# Patient Record
Sex: FEMALE | Race: WHITE
[De-identification: names, ages, dates, MRNs, and addresses within clinical notes are randomized per-mention and may not be internally consistent; named-entity substitution may affect disease eponyms.]

---

## 2017-03-08 NOTE — CODING QUERY MEDICAL NECESSITY
SUPPORTING DIAGNOSIS NEEDED





A supporting diagnosis is required for the test/procedure performed on this patient in 
order for us to be reimbursed by the patient's insurance. Please provide a supporting 
diagnosis for the following test/procedure listed below next to the test name along with 
your signature. 



*If there is no additional diagnosis for this patient that would support the following 
test/procedure please document that below next to the test/procedure.



Test(s)/Procedure(s) that require a supporting diagnosis:

  *  GLYCATED HEMOGLOBIN      DIAGNOSIS:

  *  VITAMIN B-12 LEVEL             DIAGNOSIS

  *  DOS: 3/2/17





Provider Signature:  ______________________________  Date:  _______



Thank you  

Sonja Odom

Health Information Management

Phone:  [phone]

Fax:  [phone]



Once completed, please kindly fax back to [phone]



For questions please call [phone]

## 2017-06-03 NOTE — PAP/PSG TECHNICIAN REPORT
[facility]

Technician Polysomnogram Report



Study name:

None

Report date:

6/3/2017



Study date:

6/2/2017

Referring Physician:

 DR. VELEZ



Name:

TEO MARLOW 

Interpreting Physician:

Ubaldo Velez M.D.



YOB: 1965

Technician:

Arsh Prescott RPSMELINDA.



Sex:

Female







Age:

51

StudyType:

PSG PAP



Weight:

252 lbs





16.5 inches



Height:

51 years, Height 5' 5.5"

Neck Circum:







BMI:

41.29







Medications:

ADVAIR DISKUS 250 MCG, BUPROPION HCL ER  MG, BUSPIRONE HCL 30 MG, CLONAZEPAM 0.5 MG, 
FLUOXETINE HCL 20 MG, LISINOPRIL 20 MG, LYRICA 300 MG, TRAZODONE HCL 50 MG, 















Patient History





PATIENT HAS BEEN WEARING CPAP SINCE 2000. HE MOST RECENT SLEEP STUDY WAS DONE IN 2012. SHE CURRENTLY 
DOES NOT KNOW WHAT HER PRESSURE IS SET AT. SHE IS HERE TODAY FOR AN UPDATE ON HER CPAP SETTINGS. ESS 
= 13 RM 5







Parameters Monitored

NPSG: E1-M2, E2-M1, Fp1-M2, Fp2-M1, F3-M2, F4-M2, F4-M1, C3-M2, C4-M2, C4-M1, O1-M2, O2-M2,

O2-M1, T3-M2, T4-M1, P3-M2, P4-M1, CHIN1, CHIN2, HR, EKG, Legs, PFLOW, SNOR, FLOW, CFLOW,

Tidal Volume, THOR, ABDO, SpO2, PLTH, CPRESS, ETCO2 Wave, ETCO2, pH





Sleep Architecture



Sleep Stages



Time at Lights Off

11:10:19 PM



STAGES

Time (min.)

TST (%)



Time at Lights On

5:59:19 AM



Wake

17.5

--



Total Recording Time (TRT)

409.50 min.



N1

10.0

3



Total Sleep Period (TSP)

407.0 min.



N2

244.0

62



Total Sleep Time (TST)

391.5min.



N3

66.5

17



Awake Time

17.5 min.



REM

71.0

18



Wake after Sleep Onset

15.5 min.











Sleep Efficiency (SE)

96 %











Sleep Onset Latency (SOL)

2.0 min.











Number of Stage 1 Shifts

None











Awakenings

7











Stage Changes

53











Number of REM periods

2



REM

71.0

18



REM Latency

180.5 min.



NREM

320.5

82





Body Position Analysis





Supine

Right

Left

Side

Prone

Vertical



Total Sleep Time (min.)

380.1

28.4

0.0

28.44

0.0

0.0



Total Sleep Time (%)

93%

7%

0%

7

0%

N/A%



Total Sleep Time REM (min.)

55.5

15.5

0.0

None

0.0

0.0



Total Sleep Time NREM (min.)

307.6

12.9

0.0

None

0.0

0.0



Intermittent Wake (min.)

17.0

0.5

0.0

None

0.0

0.0



Total Sleep Period (%)

93%

None





Arousals







Myoclonus (PLM) *







Events

Count

Index



Events

Count

Index



Spontaneous

14

2



Events Awake (PLMW)

9

30.9



Respiratory

2

0.3



Events Asleep w/ Arousal (PLMA)

4

0.6



PLM

4

1



Events Asleep w/o Arousal (PLMS)

66

10.1



Snoring

7

1



Total Asleep

70

10.7



Total

27

4



Total

79

12







Respiratory Analysis *





CA

OA

MA

CH

H

RERA

Total



Count

0

30

0

30



Index

0.0

0

4.6

0

4.6



Mean Duration

0.0

0.00

27.6

0.0

27.6



Longest Duration

0.0

0.00

0.0

0.0

43.1







Respiratory Event Summary 







Total

Supine

~Supine

Right

Left

Prone

REM

NREM



Apneas

Count

0

N/A

N/A

0

0





Index

0.0

0

0

0.0

N/A

N/A

0

0



Hypopneas (4% Desat)

Count

30

30

0

0

N/A

N/A

5

25





Index

4.6

5.0

0

0.0

N/A

N/A

4.2

4.7



Apneas & All Hypopneas 

Count

30

30

0

0

N/A

N/A

5

25





Index

4.6

5

0

0

N/A

N/A

4.2

4.7



Respiratory Events 

(Apn+All Hyp+RERA)

Count

30

30

0

0

N/A

N/A

5

25





Index

4.6

5

0

0.0

N/A

N/A

4.2

4.7



Respiratory Related Arousal

Count

2

30

0

0

N/A

N/A

0

2





Index

0.3

0

N/A

N/A

0

0





Snoring Analysis





Supine

Right

Left

Prone

REM

NREM

Total



Snore duration

63.3 min



Snores count

1,818

5

N/A

N/A

6

1,817

1,823



Snore mean duration

2.1 Sec



Snores index

300

11

N/A

N/A

5.1

340.2

279.4



TST with snoring (%)

16.2%







Desaturation Event Summary:



Minimum %SpO2

Event Count

Mean/Min/Max Duration(sec.)

Desaturation Index

% Time In Bed



> 90

31

47.6 / 20.5 / 73.8

5.7

79.2



86 - 90

2

27.6 / 20.5 / 34.6

1.5

19.5



81 - 85

0

N/A

0.0

1.2



76 - 80

0

N/A

0.0

0.1



71 - 75

0

N/A

0.0

0.0



66 - 70

0

N/A

0.0

0.0



61 - 65

0

N/A

0.0

0.0



56 - 60

0

N/A

0.0

0.0



51 - 55

0

N/A

0.0

0.0



< 50

0

N/A

0.0

0.0









Total

REM

NREM

Awake



<50%

0.0 min.



51 - 60%

0.0 min.



61 - 70%

0.0 min.



71 - 80%

0.5 min.

0.0 min.

0.5 min.

0.0 min.



81 - 90%

84.5 min.

0.2 min.

83.3 min.

1.0 min.



91 - 100%

323.9 min.

70.8 min.

236.7 min.

16.5 min.



Average

93

95

93

94



Minimum SpO2

77

90

77

87



Desaturation Event Index

4.7

4.2

4.9

6.9



# Desat. Events below 89%

17

N/A

17

N/A



Time(%) with Saturation below 89%

12.1

0.0

11.9

0.2



Time(min.) with Saturation below 89%

49.4

0.0

48.5

0.9









Time (mins)

REM (mins)

NREM (mins)

% of TST



SpO2 Below 90%

23

1

N22

17.1



SpO2 Below 88%

15

0

0

6





Heart Rate Analysis









Min (bpm)

Max (bpm)

Average (bpm)



Awake

54

87

64



NREM

52

85

66



REM

55

84

67



Overall

52

85

66













Supplemental O2 Values



Minimum O2 level: None



Value  

Start Time

End Time





Technician Comments





Ms. Marlow slept in the right and supine positions.  No cardiac arrhythmia noted. Leg movements 
noted.  No bruxism noted.  CPAP was initiated at +4 CMH2O and up-titrated to an optimal level of +15 
CMH2O, which nearly eliminated all respiratory events and snoring.  A Resmed Quattro Air size small 
full face mask was used during titration   Ms. Marlow awoke to use the restroom 0 times during the 
night.  Ms. Marlow stated I slept as well as I do when I am in my own bed. 



The final report will be interpreted and signed by a sleep physician. The completed physician report 
will then be placed in the patient medical record.







 



 



 



 



 



 



 



 

 



Therapy Event:



Therapy (cm H20)

4

5

6

7

8

9



Total Time at Pressure (min.)

21.3

7.8

12.6

15.7

10.6

18.0



TST at Pressure (min.)

19.3

7.8

12.6

15.7

10.6

18.0



# Periods

1



Sleep Onset (min.)

2.0

0.0



REM Onset (min.)

N/A



Sleep Efficiency %

90

100



Wakefulness (%)

9.4

0.0



Wakefulness (min.)

2.0

0.0



NREM 1 (%)

9.4

0.0



NREM 1 (min.)

2.0

0.0



NREM 2 (%)

81.2

100.0

27.5

0.0

0.0

27.4



NREM 2 (min.)

17.3

7.8

3.5

0.0

0.0

4.9



NREM 3 (%)

0.0

0.0

72.5

100.0

100.0

72.6



NREM 3 (min.)

0.0

0.0

9.2

15.7

10.6

13.1



REM (%)

0.0



REM (min.)

0.0



# Arousals

0

2



Arousal Index

0.0

6.6



# Snore

77

114

189

243

163

213



Snore Index

239.8

882.0

897.0

930.5

926.2

708.0



AHI

0.0

7.7

0.0

16.6



AHI Supine

0.0

7.7

0.0

16.6



AHI Non-Supine

N/A



NREM AHI

0.0

7.7

0.0

16.6



REM AHI

N/A



RDI

0.0

7.7

0.0

16.6



# Obstructive

0



# Central Ap

0



# Mixed

0



# Hypopneas

0

2

0

5



RERAS

0



Total Respiratory Events

0

2

0

5



Time Below SpO2 89.00% (min.)

16.1

7.3

10.3

6.0

1.0

3.6



Mean NREM SpO2 (%)

87

88

88

89

90

90



Mean REM SpO2 (%)

N/A



Mean Sleep SpO2 (%)

87

88

88

89

90

90



Min NREM SpO2 (%)

84

86

86

85

87

79



Min REM SpO2 (%)

N/A



Position Supine (min.)

19.3

7.8

12.6

15.7

10.6

18.0



Position Non-supine (min.)

0.0



LM Index Sleep

18.7

54.2

0.0

7.7

17.0

16.6



LM Index NREM

18.7

54.2

0.0

7.7

17.0

16.6



LM Index REM

N/A



Mean Heart Rate (bpm)

73

72

73

73

71

69



Min Heart Rate (bpm)

69

68

70

69

68

63







Therapy (cm H20)

10

11

12

13

14

15



Total Time at Pressure (min.)

6.6

9.4

10.7

9.2

32.9

254.2



TST at Pressure (min.)

6.6

9.4

10.7

9.2

32.4

239.2



# Periods

1



Sleep Onset (min.)

0.0



REM Onset (min.)

N/A

27.7



Sleep Efficiency %

100

98

94



Wakefulness (%)

0.0

1.5

5.9



Wakefulness (min.)

0.0

0.5

15.0



NREM 1 (%)

0.0

10.9

1.5

2.6



NREM 1 (min.)

0.0

1.0

0.5

6.5



NREM 2 (%)

100.0

63.5

38.8

89.1

89.4

61.4



NREM 2 (min.)

6.6

6.0

4.2

8.2

29.4

156.2



NREM 3 (%)

0.0

36.5

61.2

0.0

7.6

2.2



NREM 3 (min.)

0.0

3.4

6.6

0.0

2.5

5.5



REM (%)

0.0

27.9



REM (min.)

0.0

71.0



# Arousals

1

0

1

3

4

16



Arousal Index

9.1

0.0

5.6

19.5

7.4

4.0



# Snore

8

53

65

59

196

443



Snore Index

72.7

339.1

362.8

384.3

362.6

111.1



AHI

9.1

25.6

22.3

19.5

9.2

1.5



AHI Supine

9.1

25.6

22.3

19.5

9.2

1.7



AHI Non-Supine

N/A

0.0



NREM AHI

9.1

25.6

22.3

19.5

9.2

0.4



REM AHI

N/A

4.2



RDI

9.1

25.6

22.3

19.5

9.2

1.5



# Obstructive

0



# Central Ap

0



# Mixed

0



# Hypopneas

1

4

4

3

5

6



RERAS

0



Total Respiratory Events

1

4

4

3

5

6



Time Below SpO2 89.00% (min.)

0.5

1.4

1.0

1.2

0.4

0.0



Mean NREM SpO2 (%)

92

93

94

95



Mean REM SpO2 (%)

N/A

95



Mean Sleep SpO2 (%)

92

93

94

95



Min NREM SpO2 (%)

77

82

87

83

85

91



Min REM SpO2 (%)

N/A

90



Position Supine (min.)

6.6

9.4

10.7

9.2

32.4

210.7



Position Non-supine (min.)

0.0

28.4



LM Index Sleep

9.1

12.8

11.2

26.1

7.4

8.5



LM Index NREM

9.1

12.8

11.2

26.1

7.4

3.2



LM Index REM

N/A

21.1



Mean Heart Rate (bpm)

67

69

72

71

67

63



Min Heart Rate (bpm)

61

64

69

64

61

52

## 2017-06-07 NOTE — POLYSOMNOGRAPH REPORT
CLINICAL DATA:  A 51-year-old female with BMI of 41.3, referred by myself and

Dr. Ena Ordonez for a CPAP titration study.  She currently is on CPAP,

but does not know the settings for her machine.  She is snoring through the 
machine

and still having symptoms. This study is being ordered to update her CPAP

settings.  Her Dalton sleepiness score is elevated at 13/24.

 

SLEEP ARCHITECTURE:  Total sleep period was 407 minutes.  Total sleep time

was 391.5 minutes divided between 320.5 minutes of non-REM sleep and 71

minutes of REM sleep.  Sleep onset latency was 2 minutes.  REM latency was

slightly delayed at 180.5 minutes.  Sleep efficiency was 96%.  Wake after

sleep onset was 15.5 minutes.  Sleep consisted of stage N1 3%, N2 62%, N3

17%, REM 18%.

 

AROUSAL DATA:  27 arousals were recorded for an index of 4 per hour.

 

PERIODIC LIMB MOVEMENT DATA:  70 limb movements during sleep were noted for

an index of 10.7 per hour with an arousal index of 0.6 per hour.

 

RESPIRATORY DATA:  The AHI was 4.6.  There were 30 hypopneic episodes, the

mean duration of which was 27.6 seconds.

 

OXIMETRY DATA:  Nocturnal hypoxemia was seen.  Oxygen yonatan was 77% during

non-REM sleep.  The mean saturation was 92%.  Time below 88% was 15 minutes.

 

EKG:  Heart ranged from 52-85 beats per minute.  No arrhythmias were noted.

 

TECHNICIAN'S COMMENTS AND TREATMENT SUMMARY:  The patient slept in the right

and supine positions.  She used a ResMed Quattro Air, size small, full

facemask.  She was titrated up to her final pressure setting of 15 cm of

water pressure.  At 15 cm water pressure, the patient slept for 239.2 minutes

with an AHI of 1.5 and no evidence of significant nocturnal hypoxemia.

 

IMPRESSION:  Obstructive sleep apnea corrected with CPAP at 15 cm of water

pressure ResMed Quattro Air, size full facemask.

 

RECOMMENDATIONS:  The patient's CPAP should be set at 15 cm of water pressure

utilizing the above noted interface.

 

 

[facility]

## 2017-07-07 NOTE — PAT MEDICATION INSTRUCTIONS
Service Date


Jul 7, 2017.





Current Home Medication List


Bupropion (Wellbutrin Sr), 150 MG PO QPM


Buspirone Hcl (Buspirone Hcl), 15 MG PO BID


Cholecalciferol (Vitamin D3), 1 TAB PO HS


Clonazepam (Klonopin), 0.5 MG PO HS


Fluoxetine (Prozac), 40 MG PO QAM


Fluticasone Prop/Salmeterol (Advair Diskus 250/50 60 Dose), 1 PUFF INH BID


Fluticasone Propionate (Nasal) (Flonase Allergy Relief), 2 SPRAYS MONAE QAM


Lisinopril (Zestril), 20 MG PO HS


Multivitamin (Multivitamin), 1 TAB PO HS


Pregabalin (Lyrica), 300 MG PO BID


Trazodone Hcl (Trazodone), 50 MG PO HS





Medication Instructions


For Your Scheduled Surgery 








- Take the following medications the morning of surgery with a sip of water:


Pregabalin (Lyrica), 300 MG PO BID


Fluticasone Prop/Salmeterol (Advair Diskus 250/50 60 Dose), 1 PUFF INH BID


Fluticasone Propionate (Nasal) (Flonase Allergy Relief), 2 SPRAYS MONAE QAM


Fluoxetine (Prozac), 40 MG PO QAM


Buspirone Hcl (Buspirone Hcl), 15 MG PO BID








- Hold the following medications as scheduled the night before surgery:


Lisinopril (Zestril), 20 MG PO HS








- Take the following medications as scheduled the night before surgery:


Trazodone Hcl (Trazodone), 50 MG PO HS


Pregabalin (Lyrica), 300 MG PO BID


Multivitamin (Multivitamin), 1 TAB PO HS


Fluticasone Prop/Salmeterol (Advair Diskus 250/50 60 Dose), 1 PUFF INH BID


Clonazepam (Klonopin), 0.5 MG PO HS


Buspirone Hcl (Buspirone Hcl), 15 MG PO BID


Cholecalciferol (Vitamin D3), 1 TAB PO HS


Bupropion (Wellbutrin Sr), 150 MG PO QPM








If you have any questions please call us at [phone] or [phone] or 

[phone]

## 2017-07-07 NOTE — DIAGNOSTIC IMAGING REPORT
CHEST 2 VIEWS ROUTINE



CLINICAL HISTORY: Preoperative chest    



COMPARISON STUDY:  No previous studies for comparison.



FINDINGS: The cardiac and mediastinal contours are normal. There is no evidence

of focal pulmonary consolidation. There is no evidence of failure. No pleural

effusions are visualized.[ Spinal electrodes are visualized within the dorsal

spine.



IMPRESSION: No active disease in the chest.







Electronically signed by:  Amrit Jiménez M.D.

7/7/2017 1:12 PM



Dictated Date/Time:  7/7/2017 1:12 PM

## 2017-07-28 NOTE — HISTORY & PHYSICAL EXAMINATION
DATE OF ADMISSION:  08/01/2017

 

CHIEF COMPLAINT:  Right knee pain.

 

HISTORY OF PRESENT ILLNESS:  A 51-year-old female referred to me by my

partner Dr. Kemp for treatment of her right knee.  She has got a long

history of multiple musculoskeletal aches and pains and actually had a dorsal

column stimulator placed for back pain.  She has a history of right knee pain

dating back to over 5 years ago.  She had a knee arthroscopy done.  Fairly

minimal relief from this surgery.  Pain is mostly in the medial side of her

knee.  It is increased with weightbearing.  She has had an injection which

provided temporary relief, but only very temporary.  She cannot take

anti-inflammatories due to some GI issues.  She has become more debilitated

by her knee pain.  She would like to proceed with surgical treatment.

 

Of note, patient does have a history of back pain and has a dorsal column

stimulator in place and is looking to have that removed.  She also complains

of some degree of neuropathy in her lower extremities of unclear etiology.

 

PAST MEDICAL HISTORY:

1.  Hypertension.

2.  Sleep apnea with a CPAP machine.

3.  Fibromyalgia.

4.  Depression.

5.  Anxiety.

6.  Chronic back pain and spinal stenosis

7.  Obesity with BMI of 42.

8.  Hypothyroid.

9.  Unspecified neuropathy.

 

PAST SURGICAL HISTORY:  Include:

1.  Spinal cord stimulator placement.

2.  Multiple C-sections.

3.  Right knee arthroscopy done 5 years ago.

 

ALLERGIES:

1.  TO PENICILLIN WHICH CAUSED HIVES ONLY.  No breathing symptons.

2.  SULFA.

3.  NIACIN.

 

CURRENT MEDICINES:  Include:

1.  Buspirone 30 mg twice a day.

2.  Trazodone 50 mg at bedtime.

3.  Wellbutrin 150 mg twice a day.

4.  Lisinopril 20 mg.

5.  Synthroid 60 mg.

5.  Fluticasone nasal spray.

6.  Klonopin 0.5 mg 3 times a day.

7.  Advair 2 puffs a day.

8.  Lyrica 200 mg twice a day.

9.  Prozac 20 mg twice a day.

10.  Methocarbamol 50 mg for spasms 4 times a day.

 

SOCIAL HISTORY:  A 51-year-old female.  Recently .  She does have a

boyfriend who has had both of his knees replaced.

 

FAMILY HISTORY:  Noncontributory.

 

REVIEW OF SYSTEMS:  Significant for fibromyalgia.  Denies any current chest

pain or shortness of breath.  History of chronic back pain.  No DVT or PE

problems.

 

PHYSICAL EXAMINATION:

GENERAL:  Healthy, pleasant, middle-aged female.  Looks in reasonably good

health.

HEENT:  Benign.

NECK:  Supple.  No lymphadenopathy.

LUNGS:  Clear to auscultation.

HEART:  Regular rate and rhythm.

ABDOMEN:  Soft, nontender, nondistended.

EXTREMITIES:  Grossly neurovascularly intact except as follows:  Examination

of the right leg reveals patient uses a cane to get around.  She has got well

healed arthroscopic portal sites.  She has got slight varus alignment to her

knee.  She is tender over the medial joint line.  Range of motion is 5-105. 

Pretty stiff with flexion.  No clinical instability.  No particular pain with

hip motion.

 

X-RAYS:  X-rays of the right knee revealed advanced right knee DJD.  She has

got complete loss of the medial joint space, looks like she may have a small

segment of AVN of the medial femoral condyle.

 

ASSESSMENT:  A 51-year-old female with multiple musculoskeletal aches and

pains including fibromyalgia and chronic back pain with a dorsal column

stimulator in place with advanced right knee degenerative joint disease.  She 
has

failed conservative treatment.  Unfortunately, this patient has a lot of pain

issues which are not easily solvable, but her knee should be pretty manageable.

 She has had to resort to using a cane to get around.

 

PLAN:  We talked about treatment options.  She would like to proceed with

knee replacement surgery.  We had a long discussion and discussed the risks

and benefits of this and the fact that it is not going to fix all of her

problems.  We talked about the pain and the difficulty recovery.  She would

like to proceed.  The risks and benefits of right total knee replacement were

explained to the patient including but not limited to DVT, PE, death,

infection, neurologic injury, vascular injury, bleeding problems, pain,

limited range of motion, stiffness, failure to relieve symptoms, incomplete

relief of symptoms, need for further surgery in the future, fracture, leg

length inequality, nerve palsy, incomplete relief of her symptoms.  The

patient understands and desires to proceed.  Informed consent obtained.

 

This patient has had a low level of platelets, but certainly acceptable, it

is about 100.  As far as discharge plans, she is planning to be discharged to

home using [facility] home health program.  She has a boyfriend who is a

retired [employer] and had both of his knees replaced mobile assist in her care. 

She knows to hold her lisinopril the morning of surgery and bring his CPAP

machine to the hospital.

 

 

 

POLA

## 2017-08-01 NOTE — HISTORY & PHYSICAL BRIDGE NOTE
H&P Re-Evaluation


Bridge Note:


I have examined the patient, reviewed the History & Physical and in the 

interval since the performance of the History & Physical I have noted the 

following changes of clinical significance: Platelet level now down to 96.  I 

feel we need to work this up further before proceeding with elective knee 

surgery...so will cancel OR for today and get scheduled into hematology clinic.

## 2017-08-01 NOTE — PROGRESS NOTE
Progress Note


Date of Service


Aug 1, 2017.





Progress Note


Patient noted to have thrombocytopenia which was stated by PCP as chronic and 

stable.  However, on eval of recent labs taken after PCP visit platelets have 

actually dropped approximately 20% to 94k.  Given that we do not have a 

diagnosis here, are not sure of actual platelet function, and there is no 

compelling reason to avoid general anesthesia in favor of spinal anesthesia, 

this would probably preclude spinal anesthetic as a viable option for surgery.  

In addition, I spoke with Dr Vogel about the dropping count in regards to risk 

of surgical bleeding and post operative hematoma.  He has elected to delay the 

case in favor of a hematology workup for this patient and I feel that this is a 

very prudent course.  Attempts have been made to contact the patient's PCP, and 

Dr Vogel's office will further arrange this workup in conjunction with the 

patient's PCP.

## 2017-11-17 NOTE — PAT MEDICATION INSTRUCTIONS
Service Date


Nov 17, 2017.





Current Home Medication List


Bupropion (Wellbutrin Sr), 150 MG PO QPM


Buspirone Hcl (Buspirone Hcl), 15 MG PO BID


Cholecalciferol (Vitamin D3), 1 TAB PO HS


Clonazepam (Klonopin), 0.5 MG PO HS


Cyanocobalamin (Vitamin B-12), 1,000 MCG PO QAM


Fish Oil (Omega-3), 1 CAP PO QAM


Fluoxetine (Prozac), 40 MG PO QAM


Fluticasone Prop/Salmeterol (Advair Diskus 250/50 60 Dose), 1 PUFF INH BID


Fluticasone Propionate (Nasal) (Flonase Allergy Relief), 2 SPRAYS MONAE QAM


Lisinopril (Lisinopril), 5 MG PO HS


Multivitamin (Multivitamin), 1 TAB PO HS


Pregabalin (Lyrica), 300 MG PO BID





Medication Instructions


For Your Scheduled Surgery 





- Hold the following medications 2 weeks prior to surgery:


Fish Oil ([place]-3), 1 CAP PO QAM








- Hold the following medications the morning of surgery:


Cyanocobalamin (Vitamin B-12), 1,000 MCG PO QAM











- Take the following medications the morning of surgery with a sip of water 

OTHERWISE NOTHING TO EAT OR DRINK AFTER MIDNIGHT:


Buspirone Hcl (Buspirone Hcl), 15 MG PO BID


Fluticasone Prop/Salmeterol (Advair Diskus 250/50 60 Dose), 1 PUFF INH BID


Fluticasone Propionate (Nasal) (Flonase Allergy Relief), 2 SPRAYS MONAE QAM


Fluoxetine (Prozac), 40 MG PO QAM


Pregabalin (Lyrica), 300 MG PO BID


Albuterol Inhaler (use if needed; BRING TO HOSPITAL)








- Take the following medications as scheduled the night before surgery:


Multivitamin (Multivitamin), 1 TAB PO HS


Bupropion (Wellbutrin Sr), 150 MG PO QPM


Cholecalciferol (Vitamin D3), 1 TAB PO HS


Clonazepam (Klonopin), 0.5 MG PO HS


Buspirone Hcl (Buspirone Hcl), 15 MG PO BID


Fluticasone Prop/Salmeterol (Advair Diskus 250/50 60 Dose), 1 PUFF INH BID


Pregabalin (Lyrica), 300 MG PO BID


Albuterol Inhaler








- Do not take following medications the night before surgery:


Lisinopril (Lisinopril), 5 MG PO HS








If you have any questions please call us at [phone] or [phone] or 

[phone]

## 2017-12-17 NOTE — HISTORY & PHYSICAL EXAMINATION
DATE OF ADMISSION:  12/19/2017

 

CHIEF COMPLAINT:  Right knee pain and discomfort.

 

HISTORY OF PRESENT ILLNESS:  A 52-year-old female who was referred to me by

my partner Dr. Kemp for surgical treatment of her right knee.  She has got a

long history of right knee pain and discomfort, unresponsive to conservative

care.  She has got multiple other issues including fibromyalgia as well as

significant back pain and dorsal column stimulator in place as well.  The

knee pain dates back to about 6 years ago.  She did have a knee arthroscopy

which helped minimally.  She continues to be bothered by pain and discomfort,

mostly on the medial side of her knee.  It is increased with weightbearing. 

The more she walks, the more it hurts.  She did get a little temporary relief

from the injection.  We had actually scheduled for surgery about 6 months ago

but had to cancel due to her low platelet level.  She had been seen by

hematologist, Dr. Quintero and cleared for surgery.  It was felt that this

thrombocytopenia is just a result of hepatic steatosis and mild splenomegaly.

 

PAST MEDICAL HISTORY:  Significant for:

1.  Hypertension.

2.  Sleep apnea with CPAP machine.

3.  Fibromyalgia.

4.  Depression.

5.  Anxiety.

6.  Chronic back pain with spinal stenosis and dorsal column stimulator in

place.

7.  Obesity with BMI of 44.

8.  Hypothyroidism.

9.  Unspecified neuropathy.

 

PAST SURGICAL HISTORY:  Include:

1.  Spinal cord stimulator placement.

2.  Multiple C-sections.

3.  Right knee arthroscopy.

 

ALLERGIES:

1.  PENICILLIN WHICH CAUSED HIVES ONLY.  No bleeding problems.

2.  SULFA.

3.  CIPRO.

 

CURRENT MEDICINES:  Include:

1.  Buspirone 30 mg twice a day.

2.  Trazodone 50 mg at bedtime.

3.  Wellbutrin 150 mg twice a day.

4.  Lisinopril 20 mg a day.

5.  Synthroid 60 mg a day.

6.  Fluticasone nasal spray.

7.  Clonazepam 0.5 mg 3 times a day for anxiety p.r.n.

8.  Advair 2 puffs a day.

9.  Lyrica 200 mg twice a day.

10.  Prozac 20 mg.

11.  Methocarbamol 500 mg 4 times a day.

 

SOCIAL HISTORY:  She is single 52-year-old female.  She is  but

apparently has boyfriend who has had both of his knees replaced.

 

REVIEW OF SYSTEMS:  Negative for heart disease, diabetes, or blood clots.

 

REVIEW OF SYSTEMS:  Significant for the thrombocytopenia.  Denies any chest

pain or shortness of breath.  She does have a history of chronic back pain

and a dorsal column stimulator in place as well as fibromyalgia.  No history

of DVT or PE.

 

PHYSICAL EXAMINATION:

GENERAL:  Reveals a healthy pleasant, middle-aged female.  Looks to be in

reasonably good health.

HEENT:  Benign.

NECK:  Supple.  No lymphadenopathy.

LUNGS:  Clear to auscultation.

HEART:  Has a regular rate and rhythm.

ABDOMEN:  Soft, nontender, nondistended.

EXTREMITIES:  Grossly neurovascularly intact except as follows:  Examination

of the right knee reveals the patient walks with a bit of a limp.  She has

varus alignment to her knee.  She is tender over the medial joint line. 

Small knee effusion.  Range of motion of 5-105.  No instability.

 

X-RAYS:  X-rays of the right knee reviewed.  It shows advanced right knee

DJD.  She has complete loss of her medial joint space.  She has subchondral

sclerosis.  Does have a loose body in the suprapatellar pouch.

 

ASSESSMENT:  A 52-year-old female with multiple underlying medical and

musculoskeletal issues with advanced right knee degenerative joint disease. 

She has previously scheduled for surgery, had to cancel due to

thrombocytopenia.  This has been worked up and she has been cleared for

surgery.  She would like to have her right knee replaced.

 

PLAN:  We are going to take her the operating room and do a right total knee

replacement.  The risks and benefits of this procedure were explained to the

patient including but not limited to DVT, PE, death, infection, neurological

injury, vascular injury, bleeding problem, pain, limited range of motion,

stiffness, failure to relieve symptoms, incomplete relief of symptoms, need

for further surgery in the future, fracture, leg length inequality, nerve

palsy, etc.  The patient understands and desires to proceed.  Informed

consent was obtained.

 

I did talk about the pain after surgery.  Specifically, with her fibromyalgia

and this dorsal column stimulator may make it more difficult to manage her

pain.  She understands and wants to proceed.

 

We will follow her platelets postoperatively.  We will use aspirin for deep

vein thrombosis prophylaxis.  She will bring her CPAP machine to the

hospital.  She knows to hold her lisinopril the morning of surgery.

## 2017-12-19 NOTE — OPERATIVE REPORT
DATE OF OPERATION:  12/19/2017

 

SURGEON:  Dr. Leonardo Vogel.

 

ASSISTANT:  TIBURCIO Oscar

 

PREOPERATIVE DIAGNOSIS:  Right knee degenerative joint disease.

 

POSTOPERATIVE DIAGNOSIS:  Same.

 

PROCEDURE PERFORMED:  Right cemented posterior stabilized total knee

arthroplasty.

 

COMPLICATIONS:  None.

 

ESTIMATED BLOOD LOSS:  50 mL.

 

FLUID REPLACEMENT:  1700 mL crystalloid fluid replacement.

 

TOURNIQUET TIME:  Fifty five minutes at 300 mmHg.

 

ANESTHESIA:  General with adductor canal block.

 

DRAINS:  None.

 

SPECIMENS:  Right knee sent for pathology.

 

OPERATIVE INDICATIONS:  The patient is a 52-year-old female who has had a

long history of right knee problems.  She has had multiple other pain issues

as well.  She has been treated conservatively without adequate relief.  She

did have a knee scope in the past which provided minimal relief.  X-rays

showed advanced right knee DJD.  The patient elected to proceed with surgical

treatment.

 

OPERATIVE FINDINGS:  Operative findings revealed advanced right knee DJD with

grade 4 bone-on-bone disease to the medial femoral condyle and medial tibial

plateau.  She had a chronic ACL deficiency.  She had a large knee joint

effusion.

 

OPERATIVE IMPLANTS:  Operative implants consisted of:

1. Biomet Vanguard size 67.5 right posterior stabilized femoral component.

2. Biomet size 67 tibial tray.

3. A 10 mm posterior stabilized polyethylene insert.

4. A 31 x 8 all poly patella.

 

OPERATIVE PROCEDURE:  The patient taken to the operating room, identified and

placed on the operating table in supine position.  All contact areas were

appropriately padded.  IV antibiotics were provided by anesthesia team.  A

general anesthetic was implemented where she has had previous spine surgery

and a dorsal column stimulator in place.  A Larios catheter was placed in

sterile fashion.  An adductor canal block had been provided in the holding

area.  A right thigh tourniquet was then placed and the right lower extremity

was then prepped and draped in usual sterile fashion.  The right leg was

elevated and exsanguinated with Esmarch and tourniquet was placed at 300

mmHg.  An anterior approach to the right knee was then performed through a

longitudinal incision centered over the patella.  Sharp dissection was

carried through the subcutaneous tissues down to the level of the extensor

mechanism.  Medial parapatellar arthrotomy incision was made.  Some

subperiosteal dissection was carried out medially.  The fat pad was resected

from beneath the patellar tendon.  The lateral patellofemoral ligament was

released.  The patella was subluxated laterally and the knee was flexed.  The

osteophytes were taken off the distal femur.  The PCL was released from the

distal femur.  The ACL was chronically absent.  The tibia subluxated

anteriorly.  The external tibial alignment jig was then placed in the

anterior face of the tibia and adjusted 14 mm medially.  Proximal tibial cut

was made to remove about a millimeter or 2 of bone from the most deficient

aspect of the medial tibial plateau.  Some osteophytes were taken off medial

and posteromedially.  Tibia was sized to a size 67.  Attention was then drawn

to the femur.

 

The distal femur was entered with a sharp drill bit.  Intramedullary canal

was suctioned.  A right 5 degree valgus cutting guide was placed.  Distal

femoral cutting block was pinned in place.  Distal femoral cut was made to

take an additional 3 mm of bone off the distal femur.  The femur was then

sized to a size 67.5.  We downsized this just slightly.  The AP cutting block

was pinned parallel to the epicondylar axis, which was 3 degrees of external

rotation.  The anterior cut, anterior chamfer, posterior cut, posterior

chamfer cuts were made.  Box cutting guide was placed and adjusted slightly

lateral and the box cut was made.  The knee was flexed.  The remnants of the

medial and lateral menisci were excised.  The osteophytes were taken off the

posterior aspect of the femur.  A trial femoral component was placed.  Tibial

tray was pinned in maximum external rotation and drill and stem punch were

used to create defect in proximal tibia for the tibial tray.  The knee was

then trialed and a 10 mm insert fitted most appropriately.  Attention was

then drawn to the patella.

 

The patella was cleaned of all soft tissues.  Patella thickness measured 20

mm and was cut down to 13.  It was sized to a size 31 patella.  Lug holes

were drilled for a 31 patella.  The lateral osteophytes were removed. 

Patella button was placed.  Knee was taken through range of motion and the

patella tracked nicely with no thumbs test.  Attention was then drawn toward

placement of permanent components.  All trial components were removed.  A

bone plug was placed in the distal femur to limit blood loss.  A double batch

of Palacos G cement was mixed.  A Biomet right size 67.5 posterior stabilized

femoral component, a size 67 tibial tray, a 10 mm posterior stabilized

polyethylene insert, and a 31 x 8 all poly patella then cemented in place. 

Knee was brought out into full extension until cement hardened.  A final

cement check was then performed.  Pericapsular tissues were injected with 100

mL of a combination of 20 mL of Exparel, 30 mL of normal saline, 50 mL of

0.25% Marcaine with epinephrine.  The patient did receive 1 gram of

tranexamic acid.  The tourniquet was then let down for a final tourniquet

time of 55 minutes.  Hemostasis was assured using electrocautery.  The wound

was once again irrigated.  The extensor mechanism was then closed with a

combination of #1 PDS suture and #1 Vicryl suture in a figure-of-eight

fashion.  Extensor mechanism was checked and found to be intact. 

Subcutaneous tissues were then closed with 2-0 Dexon suture in a buried

interrupted fashion.  Skin was closed with skin staples.  The leg was then

cleaned and dried and a sterile dressing of 4 x 4, sterile cast padding and

Ace bandage were applied.  The patient was then brought out of general

anesthesia and transferred to the recovery room in stable condition.  The

patient tolerated the procedure without complications.  All needle and sponge

counts were correct at the end of the operation.

 

 

I attest to the content of the Intraoperative Record and any orders documented therein. Any exception
s are noted below.

## 2017-12-19 NOTE — [facility] POST OPERATIVE BRIEF NOTE
Immediate Operative Summary


Operative Date


Dec 19, 2017.





Pre-Operative Diagnosis





Advanced right knee degenerative joint disease





Post-Operative Diagnosis





Same





Procedure(s) Performed





Right Total knee arthroplasty





Surgeon


Dr. Leonardo Vogel





Assistant Surgeon(s)


Aleksandar Amin PA-C





Estimated Blood Loss


50ml





Findings


Right Knee DJD





Fluids (cc crystalloids)


1700 cc





Specimens





A. Right knee -Bone and tissue





Drains


None





Anesthesia


General





Complication(s)


None





Disposition


Recovery Room / PACU

## 2017-12-19 NOTE — DIAGNOSTIC IMAGING REPORT
R KNEE 1 OR 2 VIEWS ROUTINE



CLINICAL HISTORY: Postoperative evaluation.    



COMPARISON: Right knee radiographs November 23, 2016.



FINDINGS:  Alignment of the total right knee arthroplasty is anatomic. There is

no fracture or unexpected radiopaque foreign body. Skin staples are present.



IMPRESSION: Expected findings following total right knee arthroplasty.







Electronically signed by:  Jorje Ordonez M.D.

12/19/2017 4:24 PM



Dictated Date/Time:  12/19/2017 4:24 PM

## 2017-12-19 NOTE — ANESTHESIOLOGY PROGRESS NOTE
Anesthesia Post Op Note


Date & Time


Dec 19, 2017 at 15:59





Vital Signs


Pain Intensity:  8





Vital Signs Past 12 Hours








  Date Time  Temp Pulse Resp B/P (MAP) Pulse Ox O2 Delivery O2 Flow Rate FiO2


 


12/19/17 15:50 36.4 80 14 132/73 95 Nasal Cannula 3 





      Oxymask  


 


12/19/17 15:40  73 14 137/79 98 Oxymask 5 


 


12/19/17 15:30  85 14 139/69 99 Oxymask 5 


 


12/19/17 15:20  87 14 155/75 99 Oxymask 10 


 


12/19/17 15:14 36.3 86 18 165/97 99 Oxymask 10 


 


12/19/17 10:40 36.5 65 18 125/73 96 Room Air  











Notes


Mental Status:  alert / awake / arousable, participated in evaluation


Pt Amnestic to Procedure:  Yes


Nausea / Vomiting:  adequately controlled


Pain:  adequately controlled


Airway Patency, RR, SpO2:  stable & adequate


BP & HR:  stable & adequate


Hydration State:  stable & adequate


Anesthetic Complications:  no major complications apparent

## 2017-12-20 NOTE — DISCHARGE INSTRUCTIONS
Discharge Instructions


Date of Service


Dec 20, 2017.





Admission


Reason for Admission:  Right Knee Degenerative Joint Disease





Discharge


Discharge Diagnosis / Problem:  Right Knee Replacement





Discharge Goals


Goal(s):  Decrease discomfort, Improve function, Increase independence, Improve 

disease control, Therapeutic intervention





Activity Recommendations


Activity Limitations:  per Instructions/Follow-up section


Weightbearing Status:  Right weightbearing





.





Instructions / Follow-Up


Instructions / Follow-Up





ACTIVITY RECOMMENDATIONS:





Physical Therapy:





*  You will go to physical therapy three times each week for four to six weeks 

after


    your surgery in order to regain your knee range of motion and to retrain 

your knee


    to work properly.  


*  It is just as important to make sure you are getting your knee perfectly 

straight as


    it is to regain your knee bend.


*  Taking a pain pill an hour before therapy can help you have a more 

productive and 


    comfortable therapy session.





Home Exercise:





*  You were shown a series of exercises (heel props, heel slides, etc.) in the 

hospital.


    Do these exercises three to four times each day including the exercises you 

were 


    shown in physical therapy.





Walking:





*  Get up and walk several times each day.  For the first four weeks, try not 

to stand or


    walk for more than one hour at a time.  If you do stand or walk for more 

than one hour,


    you will not hurt anything, but your knee and leg will likely swell.  


*  As you feel comfortable, you may change from the walker or crutches to a 

cane and 


    then to independent walking.








MEDICATIONS:





New Medicine:  





*  You will likely be taking one or more of these medications:


   1.  Oxycodone - A quick and shorter-acting pain medication.  Take one to two


        tablets every four to six hours to lessen your pain.


   2.  Iron Sulfate - Take three times each day for the month after surgery to 


        help you replace the blood lost during surgery.


   3.  Aspirin - Thins your blood to lessen the chance of forming a blood clot.





*  The most common side effects of pain medicine and iron are nausea and 

constipation.


    If nausea or constipation is too much of a problem or if you have any 

questions about


    your new medicines or doses, call [facility] at [phone].  We


    will try to help you manage these issues.





VERY IMPORTANT TO READ AND REVIEW"








Pain:





*  The immediate post-operative period after knee replacement surgery is often 

quite painful.


*  You are given a prescription for pain medicine.  You should take it, as 

directed, when you 


    need it, especially before physical therapy and before going to bed.  Pain 

that interferes


    with sleep is very common and can last several months.


*  You will likely need pain medicine for the first four to six weeks.  It will 

not stop all of the


    pain.  The pain will lessen and as you feel better, you may change to 

milder pain medicine


    such as Tylenol.  


*  The most common side effects of pain medicine are nausea and constipation, 

so don't take


    more than you need.








SPECIAL CARE INSTRUCTIONS:





TEDs/Elastic Stockings:





*  The white elastic stockings help limit swelling and prevent blood clots from


    forming in your legs.  The more you wear them, the more they work.


*  Wear them for six weeks after knee replacement surgery and four weeks


    after partial knee replacement.





Prevention of Infection:





*  Take antibiotics one hour before any dental cleaning, dental work, urological


    procedure, gastrointestinal procedure or any invasive surgery in order to


    prevent your new joint from getting infected.  


*  You may get the antibiotics from the doctor performing the procedure or you 


    may call our office at [phone] before and we will call in a 

prescription 


    to the pharmacy of your choice.





Things to Watch For:





*  Drainage from the incision site that occurs more than one week after your 

surgery.


*  Severely increased knee/leg pain or swelling.


*  Increased redness at the incision site.


*  Fever above 102 degrees Fahrenheit.


*  Unusual chest pain or shortness of breath.


*  Unusual pain or burning with urination.





Call [facility] at [phone] with any of the above problems 

or 


if you have any questions about your medicines or recovery.








FOLLOW UP VISIT:





Make an appointment to see your doctor for approximately two weeks after surgery


for a progress check and staple removal by calling the office at [phone].








Current Hospital Diet


Patient's current hospital diet: Regular Diet





Discharge Diet


Recommended Diet:  Regular Diet





Procedures


Procedures Performed:  


Right Total knee arthroplasty





Pending Studies


Studies pending at discharge:  no





Medical Emergencies








.


Who to Call and When:





Medical Emergencies:  If at any time you feel your situation is an emergency, 

please call 111 immediately.





.





Non-Emergent Contact


Non-Emergency issues call your:  Surgeon


.








"Provider Documentation" section prepared by Leonardo Vogel.








.





VTE Core Measure


Inpt VTE Proph given/why not?:  Other Anticoagulation, T.E.D. Stockings, SCD's

## 2017-12-20 NOTE — PROGRESS NOTE
DATE: 12/20/2017

 

SUBJECTIVE:  A 52-year-old female postop day #1 from a right knee

replacement.  She is doing pretty well.  She describes mostly just thigh

discomfort.  Pain is manageable.  No chest pain or shortness of breath.  Not

feeling dizzy or lightheaded.

 

OBJECTIVE:

VITAL SIGNS:  Temperature 36.6.  Vital signs stable.

GENERAL:  Physical examination reveals a healthy, pleasant middle-aged

female.  She is sitting up in bed and looks pretty comfortable.

LUNGS:  Clear to auscultation.

HEART:  Regular rate and rhythm.

ABDOMEN:  Soft, nontender, and nondistended.

EXTREMITIES:  Grossly neurovascularly intact except as follows:  Examination

of the right leg reveals the dressing to be clean, dry and intact.  She can

dorsiflex and plantarflex her foot appropriately.  She is neurologically

intact.

 

LABORATORY DATA:  Hemoglobin 11.4, hematocrit 33.3 and platelets are 109,

improved from yesterday.  Electrolytes are stable.

 

ASSESSMENT:  A 52-year-old female postop day #1 from a right knee

replacement, doing pretty well.  Pain is controlled.  Her platelet levels

actually increased.

 

PLAN:

1.  DVT prophylaxis including thigh-high TEDs, SCDs, and baby aspirin twice a

day.

2.  PT/OT.  Weightbear as tolerated.  Right total knee protocol.

3.  Pain control.  Doing pretty well with current regimen.

4.  Disposition:  Planning to discharge her home with some home health once

adequately recovered.

## 2017-12-20 NOTE — ANESTHESIOLOGY PROGRESS NOTE
Anesthesia Post Op Note


Date & Time


Dec 20, 2017 at 12:52





Vital Signs


Pain Intensity:  7.0





Vital Signs Past 12 Hours








  Date Time  Temp Pulse Resp B/P (MAP) Pulse Ox O2 Delivery O2 Flow Rate FiO2


 


12/20/17 12:06 36.6 61 18 103/66 (78) 95 Room Air  


 


12/20/17 08:26 37.1 62 18 116/77 (90) 94 Room Air  


 


12/20/17 07:30      Room Air  


 


12/20/17 01:01  69 16 115/69 (84)    











Notes


Mental Status:  alert / awake / arousable, participated in evaluation


Pt Amnestic to Procedure:  Yes


Nausea / Vomiting:  adequately controlled


Pain:  adequately controlled


Airway Patency, RR, SpO2:  stable & adequate


BP & HR:  stable & adequate


Hydration State:  stable & adequate


Neuraxial Anesthesia:  sensory block resolved


Anesthetic Complications:  no major complications apparent

## 2017-12-21 NOTE — PROGRESS NOTE
DATE: 12/21/2017

 

SUBJECTIVE:  This is a 52-year-old female postop day 2 from right knee

replacement.  She is doing pretty well.  She had a little bit of chest pain

last night that she wrote off as some anxiety.  We did get an EKG that showed

no signs of ischemia.  This seems to have resolved.  Pain is under better

controlled.  No chest pain or shortness of breath and not feeling dizzy or

lightheaded.

 

OBJECTIVE:

VITAL SIGNS:  Temperature 36.5.  Vital signs stable.

GENERAL:  Reveals a healthy pleasant 52-year-old female.  She is sitting up

in bed, looks pretty comfortable.

EXTREMITIES:  Examination of the right leg reveals the dressing to be clean,

dry and intact.  She can dorsiflex and plantarflex her foot appropriately. 

She is neurologically intact.

 

ASSESSMENT:  A 52-year-old female postop day 2 from right knee replacement,

doing pretty well.  Pain is controlled.

 

PLAN:

1.  DVT prophylaxis including thigh-high TEDs, SCDs, and aspirin twice a day.

2.  PT/OT.  Weightbearing as tolerated.  Right total knee protocol.

3.  Pain control, doing well with current pain regimen.

4.  Disposition:  Plan to be discharged to home with some home health later

today.

## 2017-12-22 NOTE — DISCHARGE SUMMARY
ADMITTING PHYSICIAN AND SURGEON:  Dr. Vogel.

 

ADMITTING DIAGNOSIS:  Right knee degenerative joint disease.

 

SURGERY PERFORMED:  Right total knee arthroplasty.

 

SECONDARY DIAGNOSES:  Hypertension, sleep apnea, fibromyalgia, depression,

anxiety, chronic back pain, spinal stenosis, obesity, hypothyroidism,

neuropathy.

 

CONSULTS:  None obtained.

 

HISTORY AND PHYSICAL EXAMINATION:  Well documented in the patient's chart.

 

HOSPITAL COURSE:  The patient was admitted on 12/19/2017 underwent total knee

arthroplasty.  She tolerated procedure well.  There were no complications. 

She was transferred to the PACU postoperatively and later to the orthopedic

floor for further care.  She was given Ancef for antibiotic prophylaxis, DUSTIN

stockings, SCDs and aspirin for DVT prophylaxis.  Hemoglobin, hematocrit and

vital signs were monitored during her hospital stay and remained stable.  She

developed some mild postoperative anemia, did not require any blood

transfusions.  Postoperative day 2, she did have little chest pain overnight,

which she attributed to anxiety.  She had an EKG which showed no signs of

ischemia and her symptoms resolved.  There were no complications.

 

By postoperative day 2, she was tolerating regular diet, pain was controlled

with oral pain medicine.  She was participating in physical therapy and had

no signs or symptoms of deep vein thrombosis.

 

On postop day 2, she was discharged home and set up with home health

services, given printed discharge instructions including new prescriptions

for extra strength Tylenol, aspirin 81 mg b.i.d., iron supplement and

oxycodone.  Continue her home medications, continue physical therapy,

weightbearing as tolerated, DUSTIN stockings.  Follow up in 10-12 days or sooner

if there are any problems or concerns.

## 2018-08-03 ENCOUNTER — HOSPITAL ENCOUNTER (OUTPATIENT)
Dept: HOSPITAL 45 - C.LABBFT | Age: 53
Discharge: HOME | End: 2018-08-03
Attending: INTERNAL MEDICINE
Payer: COMMERCIAL

## 2018-08-03 DIAGNOSIS — D69.6: ICD-10-CM

## 2018-08-03 DIAGNOSIS — G62.9: ICD-10-CM

## 2018-08-03 DIAGNOSIS — I10: ICD-10-CM

## 2018-08-03 DIAGNOSIS — Z11.59: ICD-10-CM

## 2018-08-03 DIAGNOSIS — E03.9: ICD-10-CM

## 2018-08-03 DIAGNOSIS — Z00.00: Primary | ICD-10-CM

## 2018-08-03 DIAGNOSIS — E55.9: ICD-10-CM

## 2018-08-03 LAB
ALBUMIN SERPL-MCNC: 3.9 GM/DL (ref 3.4–5)
ALP SERPL-CCNC: 87 U/L (ref 45–117)
ALT SERPL-CCNC: 31 U/L (ref 12–78)
AST SERPL-CCNC: 22 U/L (ref 15–37)
BASOPHILS # BLD: 0.05 K/UL (ref 0–0.2)
BASOPHILS NFR BLD: 1.2 %
BUN SERPL-MCNC: 16 MG/DL (ref 7–18)
CALCIUM SERPL-MCNC: 8.9 MG/DL (ref 8.5–10.1)
CO2 SERPL-SCNC: 23 MMOL/L (ref 21–32)
CREAT SERPL-MCNC: 0.98 MG/DL (ref 0.6–1.2)
EOS ABS #: 0.09 K/UL (ref 0–0.5)
EOSINOPHIL NFR BLD AUTO: 96 K/UL (ref 130–400)
GLUCOSE SERPL-MCNC: 77 MG/DL (ref 70–99)
HCT VFR BLD CALC: 39 % (ref 37–47)
HGB BLD-MCNC: 12.9 G/DL (ref 12–16)
IG#: 0.02 K/UL (ref 0–0.02)
IMM GRANULOCYTES NFR BLD AUTO: 22.1 %
LYMPHOCYTES # BLD: 0.89 K/UL (ref 1.2–3.4)
MCH RBC QN AUTO: 29.7 PG (ref 25–34)
MCHC RBC AUTO-ENTMCNC: 33.1 G/DL (ref 32–36)
MCV RBC AUTO: 89.7 FL (ref 80–100)
MONO ABS #: 0.22 K/UL (ref 0.11–0.59)
MONOCYTES NFR BLD: 5.5 %
NEUT ABS #: 2.76 K/UL (ref 1.4–6.5)
NEUTROPHILS # BLD AUTO: 2.2 %
NEUTROPHILS NFR BLD AUTO: 68.5 %
PMV BLD AUTO: 10.8 FL (ref 7.4–10.4)
POTASSIUM SERPL-SCNC: 3.8 MMOL/L (ref 3.5–5.1)
PROT SERPL-MCNC: 6.9 GM/DL (ref 6.4–8.2)
RED CELL DISTRIBUTION WIDTH CV: 15.5 % (ref 11.5–14.5)
RED CELL DISTRIBUTION WIDTH SD: 50.7 FL (ref 36.4–46.3)
SODIUM SERPL-SCNC: 139 MMOL/L (ref 136–145)
WBC # BLD AUTO: 4.03 K/UL (ref 4.8–10.8)

## 2018-08-15 ENCOUNTER — HOSPITAL ENCOUNTER (EMERGENCY)
Dept: HOSPITAL 45 - C.EDB | Age: 53
Discharge: HOME | End: 2018-08-15
Payer: COMMERCIAL

## 2018-08-15 VITALS — SYSTOLIC BLOOD PRESSURE: 134 MMHG | OXYGEN SATURATION: 94 % | DIASTOLIC BLOOD PRESSURE: 76 MMHG | HEART RATE: 62 BPM

## 2018-08-15 VITALS — TEMPERATURE: 98.42 F

## 2018-08-15 VITALS — HEIGHT: 64.02 IN

## 2018-08-15 DIAGNOSIS — F41.9: ICD-10-CM

## 2018-08-15 DIAGNOSIS — S80.811A: ICD-10-CM

## 2018-08-15 DIAGNOSIS — S80.812A: ICD-10-CM

## 2018-08-15 DIAGNOSIS — F32.9: ICD-10-CM
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
## 2018-08-15 DIAGNOSIS — S82.832A: Primary | ICD-10-CM
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
## 2018-08-15 DIAGNOSIS — M25.571: ICD-10-CM

## 2018-08-15 DIAGNOSIS — Z79.82: ICD-10-CM

## 2018-08-15 NOTE — DIAGNOSTIC IMAGING REPORT
R ANKLE MIN 3 VIEWS ROUTINE



CLINICAL HISTORY: Right ankle pain following fall.    



COMPARISON: None



FINDINGS:  Alignment of the right ankle is anatomic. There is no acute fracture.

Well-corticated ossicles along the medial malleolus represents old injury.

Moderate posterior and plantar calcaneal spurring is noted. There is mild ankle

soft tissue swelling.



IMPRESSION: No acute fracture or dislocation within the right ankle.







Electronically signed by:  Jorje Ordonez M.D.

8/15/2018 7:25 PM



Dictated Date/Time:  8/15/2018 7:24 PM

## 2018-08-15 NOTE — DIAGNOSTIC IMAGING REPORT
L ANKLE MIN 3 VIEWS ROUTINE



CLINICAL HISTORY: Left ankle pain following fall.    



COMPARISON: None



FINDINGS:  Note is made of an oblique comminuted mildly displaced fracture of

the distal shaft of the left fibula which extends from the level of the

tibiotalar joint 5 cm proximally. There is significant medial ankle mortise

widening. No acute fracture of the distal left tibia is identified. There is

extensive posterior and plantar calcaneal spurring.



IMPRESSION: Acute oblique comminuted mildly displaced distal left fibular

fracture with significant medial ankle mortise widening.







Electronically signed by:  Jorje Ordonez M.D.

8/15/2018 7:15 PM



Dictated Date/Time:  8/15/2018 7:13 PM

## 2018-08-15 NOTE — EMERGENCY ROOM VISIT NOTE
ED Visit Note


First contact with patient:  18:33


I did evaluate and examine this patient myself.  I did guide management for the 

patient. I agree with the PA's assessment as discussed.  Please see the PAs 

dictation for further details.  I did independently review the x-rays.  Case 

was discussed with orthopedics.  She was placed in a splint and will follow 

with orthopedics as an outpatient.  She denies any other injuries.

## 2018-08-15 NOTE — DIAGNOSTIC IMAGING REPORT
L FOOT MIN 3 VIEWS ROUTINE



CLINICAL HISTORY: Left foot pain following fall.    



COMPARISON: None



FINDINGS:  Tarsometatarsal joints are intact. Note is made of an acute oblique

comminuted mildly displaced fracture of the distal shaft of the left fibula.

Ankle mortise widening is better depicted on the left ankle radiographs. There

is no acute fracture within the left foot. Posterior plantar calcaneal spurring

are noted.



IMPRESSION: 



1. Acute oblique comminuted mildly displaced fracture of the distal shaft of the

left fibula. Medial ankle mortise widening better depicted on the left ankle

radiographs.



2. No acute fracture or dislocation within the left foot.







Electronically signed by:  Jorje Ordonez M.D.

8/15/2018 7:24 PM



Dictated Date/Time:  8/15/2018 7:21 PM 18mo F, ex 32wks, PMHx of RSV bronchiolitis requiring PICU admission, p/w increased work of breathing and cough. Mom sick with URI today as swell. No home nebulizer/inhaler.  Fever in ED. Playful per usual. Normal wet diapers. No diarrhea. Tolerating PO.

## 2018-08-16 NOTE — EMERGENCY ROOM VISIT NOTE
ED Visit Note


First contact with patient:  18:33


Chief Complaint: Fell off the curb.





History of Present Illness: Ms. Marlow is a 52-year-old white female who is 

brought into the ED via wheelchair complaining of bilateral ankle pain and left 

foot pain.


Patient reports approximately 3:00 this afternoon, about 5 hours ago, she was 

standing on a curb by her house, lost her footing and slipped and fell to the 

ground.  She reports at that time her left foot was caught on something and 

twisted but she is not exactly sure the mechanism of injury.  She tried to 

describe this and this appeared to be an inversion injury.  She reports she 

immediately started having pain in both ankles and the left foot.  Additionally 

she felt when she fell she heard a popping sensation.


Before the fall she was not experiencing any lightheadedness or dizziness, at 

the time of the fall she did not strike her head and since the injury she 

denies all signs of head injury.


She reports her most severe pain is over the lateral aspect of the left ankle.  

She describes this as a sharp and throbbing sensation.  She rates her 

discomfort 10/10.  She denies true radiation of pain but does report 

intermittently she feels a sharp pain shooting up to the mid fibular area.  Her 

pain worsens with all movement of the ankle and foot and palpation.  She has 

not ambulated on the foot.  Associated with her pain she reports there is a 

tingling sensation through the toes and the top of the foot.  She has taken her 

BuSpar medication 2 times since the fall.  This is not giving her any relief of 

her discomfort.  She denies any previous significant injuries or surgeries to 

the left ankle or foot.


Additionally she complains of foot pain over the lateral top of the forefoot 

and over the lateral aspect of the foot in the area of the fourth and fifth 

metatarsals.  She describes this as a throbbing sensation.  She does not rate 

her discomfort.  Her pain worsens with palpation and movement of the toes.


Additionally she complains of right ankle pain over the lateral aspect of the 

ankle just anterior to the fibular head.  She reports this as an achy sensation 

and very mild but does not rate it.  Pain worsens minimally with palpation and 

inversion.  She has not identified any alleviating factors.  She has no 

additional symptoms with this.


Additionally she denies chest pain, shortness of breath, abdominal pain, nausea

, neck pain, back pain, bilateral hip pain and upper extremity pain.





Review of Systems: As noted above in history of present illness. At  least 10/8/

5 body systems were reviewed and found to be negative as noted above.





Past Medical History: Hypotonic chin, fibromyalgia, sleep apnea, depression, 

anxiety, chronic back pain with spinal stenosis, hypothyroidism, status post 

spinal cord stimulator placement, multiple  sections, right knee 

arthroplasty


Current Medications:








 Medications  Dose


 Route/Sig


 Max Daily Dose Days Date Category Dose


Instructions


 


 Oxycodone HCl 5


 Mg Tab  5-10 Mg


 PO Q6H PRN


   30 17 Rx  Take as needed for Pain.


 


 Aspirin EC Low


 Dose (Aspirin) 81


 Mg Ectab  81 Mg


 PO BID


   30 17 Rx  Take to prevent blood clots.


 


 Sb Non-Aspirin


 Extra Stre


  (Acetaminophen)


 500 Mg Tab  1,000 Mg


 PO Q8


   30 17 Rx  Take 3 times per day to lessen pain.


 


 Ferrous Gluconate


 324 Mg Tab  324 Mg


 PO BIDM


   30 17 Rx  Take to restore blood count.


 


 Ventolin Hfa


  (Albuterol) 200


 Puffs/45061 Mcg


 Aers  2 Puffs


 INH Q6H PRN


    17 Reported 


 


 Vitamin B-12


  (Cyanocobalamin)


 1,000 Mcg Tab  1,000 Mcg


 PO QAM


    17 Reported 


 


 Lisinopril 5 Mg


 Tab  5 Mg


 PO HS


    11/3/17 Reported 


 


 Multivitamin


  (Multivitamins)


 Tab  1 Tab


 PO HS


    17 Reported 


 


 Vitamin D3


  (Cholecalciferol)


 1,000 Unit Tab  1 Tab


 PO HS


   90 17 Reported 


 


 Lyrica


  (Pregabalin) 300


 Mg Cap  300 Mg


 PO BID


    17 Reported 


 


 Prozac


  (Fluoxetine HCl)


 40 Mg Cap  40 Mg


 PO QAM


    17 Reported 


 


 Flonase Allergy


 Relief


  (Fluticasone


 Propionate


  (Nasal)) 50


 Mcg/Act Spr  2 Sprays


 MONAE QAM


    17 Reported 


 


 Klonopin


  (Clonazepam) 0.5


 Mg Tab  0.5 Mg


 PO HS


    17 Reported 


 


 Buspirone Hcl 30


 Mg Tab  15 Mg


 PO BID


   30 17 Reported 


 


 Wellbutrin Sr


  (Bupropion HCl)


 150 Mg Ertab  150 Mg


 PO QPM


    17 Reported 


 


 Advair Diskus


 250/50 60 Dose


  (Fluticasone


 Prop/Salmeterol)


 1 Ea Aerp  1 Puff


 INH BID


    17 Reported 





Allergies to Medications: Ciprofloxacin, erythromycin, penicillin, sulfa.


Social History: Patient is not employed, she reports she is on disability; she 

feels safe in her home environment; she denies tobacco and alcohol use.





Physical Examination:


Vital Signs: 








  Date Time  Temp Pulse Resp B/P (MAP) Pulse Ox O2 Delivery O2 Flow Rate FiO2


 


8/15/18 23:10  62 16 134/76 94 Room Air  


 


8/15/18 21:13  76 16 115/76 94 Room Air  


 


8/15/18 18:19 36.9 88 16 130/86 94 Room Air  





GENERAL: 52-year-old female in mild to moderate distress due to pain, nontoxic-

appearing, afebrile and hemodynamically stable.


NEUROLOGICAL: Awake, alert and oriented to person, place and time.  Answering 

questions appropriately and following commands.  Good hand eye coordination.  

No focal motor or sensory deficits.


SKIN: Warm, dry and pink.  Lower Legs: Patient has multiple abrasions on the 

lower leg most predominantly on the left than the right.  There is no full-

thickness soft tissue injuries.  There is no active bleeding.


HEENT:  Atraumatic and normocephalic.  


LOWER EXTREMITIES: Moderate deformity noted over the left lateral ankle.  No 

shortening or malrotation.  No tenderness in the hips, thighs, knees.  Soft 

tissue injuries as noted above.  Moderate tenderness and crepitus was noted 

over the left lateral ankle.  She was not able to do range of motion and I was 

not able to assess ligamentous structures.  She had some mild tenderness over 

the lateral aspect of the foot but no palpable bony deformity or crepitus.  

Distal pulses and sensation of light touch was intact.  Capillary refill was 

brisk.  She was able to wiggle her toes.  Her to lose tendon was intact and 

palpable.  There was mild tenderness over the lateral aspect of the right ankle 

with minimal swelling but no bony deformity or crepitus.  Full range of motion 

in plantarflexion and dorsiflexion of the ankle.  No ligamentous laxity.  

Throughout the foot the skin was warm and pink and capillary refill was brisk.  

She is able to distinguish light sensations to all dermatomes.  





ED Course:


Patient is assessed as noted above.


Patient's medication list was reviewed.


Left Ankle X-Rays: Was read by myself and the radiologist showing an acute 

oblique comminuted mildly displaced fracture of the distal fibula with 

significant medial widening of the ankle mortise joint.  Additionally 

radiologist notes there is extensive posterior and plantar calcaneal spurring.


Left Foot X-Rays: Were read by myself and the radiologist showing no acute 

fractures or dislocations within the foot.  Patient's ankle fracture and 

calcaneus spurring were noted previously.


Right Ankle X-Rays: Were read by myself and the radiologist showing no acute 

fractures or dislocations.  Radiologist notes well-corticated ossicles along 

the medial malleolus representing old on injuries, moderate posterior and 

plantar calcaneus spurring and mild ankle swelling.


An IV lock was initiated and labs were drawn in case surgery was required.  She 

received a total of 8 mg of morphine IV for pain and 4 mg of Zofran IV.


Patient was reassessed multiple times during her stay in the emergency 

department.


Patient's case was reviewed with Dr. Myers; he independently assessed the 

patient we agreed on diagnostic approach, treatment, disposition and plan.


Patient's case was consulted with Dr. He, [facility]; he 

recommended splinting techniques, crutches and nonweightbearing and office 

follow-up for possible surgery next week.


I did have a lengthy conversation with the patient to inform her she could not 

bear any weight on her ankle or that could make it more unstable.  She was 

given her choice of ambulation techniques and she chose crutches.


Patient was placed in a posterior ankle splint at 90 as well as a stirrup 

splint.  She was educated on crutch use.


Patient was educated about today's findings and instructed on her treatment plan

; she verbalized understanding and agreement with this plan.





Clinical Impression: Comminuted left distal fibula fracture.  Widening of the 

ankle mortise joint.  Multiple abrasions.  Left ankle pain.





Disposition: Patient discharged home in stable condition accompanied by 

multiple family members; prior to departure she was reassessed and subjectively 

reported she was feeling much better and rated her discomfort 5/10.





Plan:


Comfort measures were discussed with the patient including rest, ice, elevation

, splint and crutch use and a sliding pain medication scale ibuprofen, 

acetaminophen and OxyIR; her name was checked in state database and no red 

flags were noted and she was given appropriate narcotic precautions.


Patient was encouraged to follow-up with [facility] for specialty 

care and treatment and surgery.


Patient was encouraged return the ED for worsening/uncontrolled pain, 

uncontrolled swelling, foot weakness/numbness/tingling or any new/concerning 

symptoms.

## 2018-08-17 ENCOUNTER — HOSPITAL ENCOUNTER (OUTPATIENT)
Dept: HOSPITAL 45 - C.ACU | Age: 53
Discharge: HOME | End: 2018-08-17
Attending: ORTHOPAEDIC SURGERY
Payer: COMMERCIAL

## 2018-08-17 VITALS
OXYGEN SATURATION: 98 % | TEMPERATURE: 97.34 F | SYSTOLIC BLOOD PRESSURE: 122 MMHG | DIASTOLIC BLOOD PRESSURE: 64 MMHG | HEART RATE: 68 BPM

## 2018-08-17 VITALS
DIASTOLIC BLOOD PRESSURE: 53 MMHG | HEART RATE: 66 BPM | TEMPERATURE: 97.16 F | SYSTOLIC BLOOD PRESSURE: 113 MMHG | OXYGEN SATURATION: 97 %

## 2018-08-17 VITALS
OXYGEN SATURATION: 93 % | SYSTOLIC BLOOD PRESSURE: 123 MMHG | TEMPERATURE: 98.06 F | DIASTOLIC BLOOD PRESSURE: 72 MMHG | HEART RATE: 66 BPM

## 2018-08-17 VITALS
BODY MASS INDEX: 45.73 KG/M2 | HEIGHT: 64.02 IN | HEIGHT: 64.02 IN | BODY MASS INDEX: 45.73 KG/M2 | WEIGHT: 267.86 LBS | WEIGHT: 267.86 LBS

## 2018-08-17 DIAGNOSIS — G62.9: ICD-10-CM

## 2018-08-17 DIAGNOSIS — Z88.2: ICD-10-CM

## 2018-08-17 DIAGNOSIS — M79.7: ICD-10-CM

## 2018-08-17 DIAGNOSIS — Z99.89: ICD-10-CM

## 2018-08-17 DIAGNOSIS — Z88.0: ICD-10-CM

## 2018-08-17 DIAGNOSIS — D69.6: ICD-10-CM

## 2018-08-17 DIAGNOSIS — S82.62XA: Primary | ICD-10-CM

## 2018-08-17 DIAGNOSIS — E03.9: ICD-10-CM

## 2018-08-17 DIAGNOSIS — E66.9: ICD-10-CM

## 2018-08-17 DIAGNOSIS — G47.33: ICD-10-CM
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
## 2018-08-17 DIAGNOSIS — Z96.651: ICD-10-CM

## 2018-08-17 LAB — EOSINOPHIL NFR BLD AUTO: 93 K/UL (ref 130–400)

## 2018-08-17 NOTE — DISCHARGE INSTRUCTIONS
Discharge Instructions


Date of Service


Aug 17, 2018.





Admission


Reason for Admission:  Left Ankle Fracture





Discharge


Discharge Diagnosis / Problem:  LEFT ANKLE FRACTURE





Discharge Goals


Goal(s):  Therapeutic intervention





Activity Recommendations


Activity Limitations:  per Instructions/Follow-up section


Weightbearing Status:  Left non-weightbearing





.





Instructions / Follow-Up


Instructions / Follow-Up





MEDICATIONS:





*  Resume previous medications unless instructed otherwise by your surgeon.





*  Always take pain medication on a full stomach or with food to avoid upset 

stomach. 





*  Do not drink alcohol or drive while taking narcotics.





*  Ibuprofen or Tylenol may be taken if narcotic not needed.








SPECIAL CARE INSTRUCTIONS:





__ None





_X_ Keep extremity elevated and iced x 48 hours; apply ice 20-30


    minutes 8-10 times/day. May remove at night.





_X_ Crutches/WALKER 


    __ May discard when able





__ Brace/Post-op shoe


    __ 24 hrs/day


    __ Remove at night





_X_ Dressing


    _X_ Maintain until seen in office, may shower with plastic over site


    __ Remove dressings in 24-48 hours and then may shower


    __ Cover incisions with band-aids after showering


    __ Do not remove steri-strips





Call physician if chills or temperature rises above 102 degrees or


pain unrelieved by prescribed pain medications. 


Office [phone]








FOLLOW UP IN 2 WEEKS





Current Hospital Diet


Patient's current hospital diet:





Discharge Diet


Recommended Diet:  Regular Diet





Procedures


Procedures Performed:  


Open Reduction Internal Fixation Left Ankle Fracture





Pending Studies


Studies pending at discharge:  no





Medical Emergencies








.


Who to Call and When:





Medical Emergencies:  If at any time you feel your situation is an emergency, 

please call 911 immediately.





.





Non-Emergent Contact


Non-Emergency issues call your:  Surgeon


.








"Provider Documentation" section prepared by Luke Amin.








.

## 2018-08-17 NOTE — [facility] POST OPERATIVE BRIEF NOTE
Immediate Operative Summary


Operative Date


Aug 17, 2018.





Pre-Operative Diagnosis





Left Ankle Fracture





Post-Operative Diagnosis





Same as preop





Procedure(s) Performed





Open Reduction Internal Fixation Left Ankle Fracture





Surgeon


Dr. Vogel





Assistant Surgeon(s)


Aleksandar Amin PA-C





Estimated Blood Loss


20 ml





Findings


Consistent with Post-Op Diagnosis





Fluids (cc crystalloids)


1000 cc





Specimens





None per Surgeon





Anesthesia Type


General





Complication(s)


none





Disposition


Accompanied Pt To Recover:  no


Disposition:  Recovery Room / PACU





Overlapping Procedure


I was present for:  the critical portions of procedure.


I was immediately available:  during the entire case

## 2018-08-17 NOTE — OPERATIVE REPORT
DATE OF OPERATION:  08/17/2018

 

SURGEON:  Leonardo Vogel MD

 

ASSISTANT:  TIBURCIO Oscar

 

PREOPERATIVE DIAGNOSIS:  Left Gomez B ankle fracture.

 

POSTOPERATIVE DIAGNOSIS:  Left Gomez B ankle fracture.

 

PROCEDURE PERFORMED:  Open reduction internal fixation of left unstable Gomez

B ankle fracture.

 

COMPLICATIONS:  None.

 

ESTIMATED BLOOD LOSS:  20 mL.

 

FLUID REPLACEMENT:  100 mL of crystalloid fluid replacement.

 

TOURNIQUET TIME:  33 minutes at 300 mmHg.

 

ANESTHESIA:  General.

 

SPECIMENS:  None.

 

OPERATIVE INDICATIONS:  The patient is a 52-year-old female who is well known

to me from previous right knee replacement.  She sustained an injury to her

ankle 2 days ago when she fell off her porch.  She was seen in the Emergency

Room with diagnosis of an unstable Gomez B ankle fracture.  The patient was

indicated for surgical treatment.

 

OPERATIVE IMPLANTS:  Operative implants consisted of:

1.  A Synthes 8-hole 1/3 semitubular stainless steel plate.

2.  A 3.5 fully threaded cortical screws x4.

3.  A 4.0 fully threaded cancellous screws x3.

4.  A 4.0 partially threaded cancellous screws x1.

 

OPERATIVE PROCEDURE:  The patient taken to the operating room, identified and

placed on the operating table in the supine position.  All contact areas were

appropriately padded.  IV antibiotics were provided by anesthesia team.  A

general anesthetic was implemented.  A left thigh tourniquet was then placed.

 The splint was taken off the left ankle.  I then scrubbed the left ankle and

leg with Hibiclens and then prepped it with ChloraPrep and draped in usual

sterile fashion.

 

The left leg was elevated, exsanguinated with Esmarch and tourniquet placed

at 300 mmHg.  Direct lateral approach to the fibula was then performed

through a longitudinal incision.  Sharp dissection was carried through the

subcutaneous tissues directly down to the bone.  I then incised the bone over

the fibula and very carefully cleaned off the periosteum around the fracture

site.  We tried to minimize stripping elsewhere.  The fracture was easily

visible and was fairly comminuted.  I irrigated the fracture of all clot.  I

then reduced the fracture using the 2 reduction clamps.  I fixed it with a

single 4.0 partially threaded cancellous screw placed in an anterior to

posterior direction using a line technique in the partially-threaded

cancellous screw.  This provided a quite good provisional fixation.  There

was comminution, so it was not rigid.  I then contoured an 8-hole 1/3rd

semitubular plate to the lateral aspect of fibula.  It was fixed proximally

with four 3.5 fully-threaded cortical screws, distally with three 4.0

cancellous screws.  X-ray was then brought in.  I stressed the ankle.  There

was no gapping of the medial clear space.  We did not need to place a

syndesmosis screw.  I then irrigated the wound.  I injected locally with 30

mL of 0.5% Marcaine with epinephrine.  The periosteum was then closed with #1

Vicryl suture in a figure-of-eight fashion.  The tourniquet was then let down

for a final tourniquet time of 33 minutes.  Hemostasis was assured using

electrocautery.  The subcutaneous tissue was then closed with 2-0 Dexon

suture in a buried interrupted fashion.  Skin was closed with 3-0 nylon

suture in a simple fashion.  The leg was then cleaned and dried and a sterile

dressing of Xeroform, 4 x 4's, sterile cast padding, and a well-padded

posterior and stirrup splint were applied.  The patient was then brought out

of general anesthesia and transferred to the recovery room in stable

condition.  The patient tolerated the procedure well with no complication. 

All needle and sponge counts were correct at the end of the operation.

 

 

I attest to the content of the Intraoperative Record and any orders documented therein. Any exception
s are noted below.

## 2018-08-17 NOTE — DIAGNOSTIC IMAGING REPORT
******** ADDENDUM ********





IMPRESSION:: epidural biostimulator electrodes at the T8-T9 level.







Electronically signed by:  Tin Cortes M.D.

8/17/2018 10:26 AM



Dictated Date/Time:  8/17/2018 10:25 AM



******** ORIGINAL REPORT ********





CHEST 2 VIEWS ROUTINE



CLINICAL HISTORY: PREOPERATIVE EXAM preoperative



COMPARISON STUDY:  7/7/2017



FINDINGS: The bones soft tissues and hemidiaphragms are normal. The

cardiomediastinal silhouette is normal. The lungs are clear. The pulmonary

vasculature is normal. 



IMPRESSION:  Negative chest. 

Made of epidural biostimulator electrodes with the superior extension at T8-T9.











The above report was generated using voice recognition software.  It may contain

grammatical, syntax or spelling errors.









Electronically signed by:  Tin Cortes M.D.

8/17/2018 10:05 AM



Dictated Date/Time:  8/17/2018 10:04 AM

## 2018-08-17 NOTE — ANESTHESIOLOGY PROGRESS NOTE
Anesthesia Post Op Note


Date & Time


Aug 17, 2018 at 13:25





Vital Signs


Pain Intensity:  5





Vital Signs Past 12 Hours








  Date Time  Temp Pulse Resp B/P (MAP) Pulse Ox O2 Delivery O2 Flow Rate FiO2


 


8/17/18 13:10  67 12 111/54 94 Nasal Cannula 2 


 


8/17/18 13:00  68 14 120/59 93 Nasal Cannula 2 


 


8/17/18 12:50  68 16 123/60 90 Room Air  


 


8/17/18 12:40  65 16 121/68 92 Room Air  


 


8/17/18 12:30  72 16 127/71 99 Oxymask 8 


 


8/17/18 12:26 36 79 16 121/52 97 Oxymask 8 


 


8/17/18 09:20 36.7 66 20 123/72 (89) 93 Room Air  











Notes


Mental Status:  alert / awake / arousable, participated in evaluation


Pt Amnestic to Procedure:  Yes


Nausea / Vomiting:  adequately controlled


Pain:  adequately controlled


Airway Patency, RR, SpO2:  stable & adequate


BP & HR:  stable & adequate


Hydration State:  stable & adequate


Anesthetic Complications:  no major complications apparent

## 2018-08-17 NOTE — DIAGNOSTIC IMAGING REPORT
L ANKLE 2 VIEWS



CLINICAL HISTORY: ORIF LEFT ANKLE   



COMPARISON STUDY:  Left ankle 8/15/2018.



FINDINGS: 6 fluoroscopic spot images of the left ankle. Total fluoroscopy time

was 11 seconds. There is a lateral cortical plate transfixed with screws

bridging the distal fibular fracture. The alignment is near-anatomic. The

hardware is intact.



IMPRESSION:  Fluoroscopy provided for internal fixation of a distal left fibular

fracture. The hardware appears intact. 









Electronically signed by:  Harris Toro M.D.

8/17/2018 12:25 PM



Dictated Date/Time:  8/17/2018 12:24 PM

## 2021-04-22 NOTE — DIAGNOSTIC IMAGING REPORT
ABDOMINAL ULTRASOUND, RIGHT UPPER QUADRANT



HISTORY:      LIVER   SPLEEN, SPLENOMEGALY.



COMPARISON:  None.



FINDINGS:



Pancreas: The pancreas demonstrates a normal echotexture.



Liver: The liver is echogenic consistent with fatty change. Measures 17 cm in

length.



Gallbladder: Multiple gallstones. No gallbladder wall thickening.



CBD: 3 mm.



Right kidney: No hydronephrosis.



Spleen: 18 cm in length and demonstrating a volume of 630 cc. Multiple scattered

calcified granulomas. No perisplenic fluid collections.



IMPRESSION: 



1. Splenomegaly.

2. Mild hepatic steatosis.

3. Cholelithiasis.







Electronically signed by:  Harris Toro M.D.

8/18/2017 10:53 AM



Dictated Date/Time:  8/18/2017 10:51 AM
yes